# Patient Record
Sex: FEMALE | Race: WHITE
[De-identification: names, ages, dates, MRNs, and addresses within clinical notes are randomized per-mention and may not be internally consistent; named-entity substitution may affect disease eponyms.]

---

## 2019-03-19 ENCOUNTER — HOSPITAL ENCOUNTER (EMERGENCY)
Dept: HOSPITAL 92 - SCSER | Age: 24
Discharge: HOME | End: 2019-03-19
Payer: COMMERCIAL

## 2019-03-19 DIAGNOSIS — S43.015A: Primary | ICD-10-CM
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
## 2019-03-19 DIAGNOSIS — S43.035A: ICD-10-CM

## 2019-03-19 PROCEDURE — 23650 CLTX SHO DSLC W/MNPJ WO ANES: CPT

## 2019-03-19 NOTE — RAD
LEFT SHOULDER TWO VIEW

3/19/19

 

HISTORY: 

Post reduction.

 

COMPARISON:  

Radiograph same day.

 

FINDINGS: 

Satisfactory alignment post reduction left glenohumeral joint. Large Hill-Sachs deformity posterolate
ral left humeral head. 

 

Heart size is enlarged. 

 

IMPRESSION:  

Satisfactory post reduction appearance. 

 

POS: [facility]